# Patient Record
Sex: MALE | Race: WHITE | NOT HISPANIC OR LATINO | Employment: OTHER | URBAN - METROPOLITAN AREA
[De-identification: names, ages, dates, MRNs, and addresses within clinical notes are randomized per-mention and may not be internally consistent; named-entity substitution may affect disease eponyms.]

---

## 2023-03-09 ENCOUNTER — NURSING HOME VISIT (OUTPATIENT)
Dept: POST ACUTE CARE | Facility: EXTERNAL LOCATION | Age: 88
End: 2023-03-09
Payer: MEDICARE

## 2023-03-09 DIAGNOSIS — I25.2 H/O NON-ST ELEVATION MYOCARDIAL INFARCTION (NSTEMI): ICD-10-CM

## 2023-03-09 DIAGNOSIS — D64.9 ANEMIA, UNSPECIFIED TYPE: Primary | ICD-10-CM

## 2023-03-09 DIAGNOSIS — K21.9 GASTROESOPHAGEAL REFLUX DISEASE WITHOUT ESOPHAGITIS: ICD-10-CM

## 2023-03-09 PROCEDURE — 99308 SBSQ NF CARE LOW MDM 20: CPT | Performed by: NURSE PRACTITIONER

## 2023-03-09 NOTE — LETTER
Patient ID: Chidi Carrillo is a 88 y.o. male who is acute skilled care being seen and evaluated for multiple medical problems.  [id number]   4/13/1934  Svitlana Carmona MURALI  DOS: 3/9/2023     /67   Pulse 71   Temp 36.6 °C (97.9 °F)   Resp 17   Wt 89.8 kg (198 lb)   SpO2 95%     Assessment/Plan  Problem List Items Addressed This Visit          Digestive    Gastroesophageal reflux disease without esophagitis     Protonix 40 mg by mouth daily, Sucralfate 1 gram by mouth AC/HS. Denies GI upset            Hematologic    Anemia - Primary     Hgb 7.7. Client denies hematuria or blood in stool. Client instructed to follow up with PCP or go to ED if bleeding noted             Other    H/O non-ST elevation myocardial infarction (NSTEMI)     Denies CP. Brilinta 90 mg by mouth bID, ASA 81 mg by mouth daily, Carvedilol 3.125 mg by mouth bID            HPI: Client states he will discharging home tomorrow. Home health with aide and nurse, physical therapy, and occupational therapy. He needs a wheelchair. This client is not able to use a walker safely at this time second to an unsteady gait. He needs a wheelchair to assist with ADLs including dressing and hygiene. He has upper body strength and can self propel. Discussed hgb 7.7. Client states he will follow up OP. Denies bleeding. Client denies HA, dizziness, or lightheadedness. Denies CP, SOB, Cough, or increased edema. Denies abdominal pain, N/V, diarrhea, or constipation. Denies dysuria. No current C/O pain.         Review of Systems ROS as described in in HPI     Physical Exam  Constitutional:       Appearance: Normal appearance.      Comments: Sitting in wheelchair    HENT:      Head: Normocephalic.      Mouth/Throat:      Mouth: Mucous membranes are moist.   Cardiovascular:      Rate and Rhythm: Normal rate.   Pulmonary:      Effort: Pulmonary effort is normal.      Breath sounds: Wheezing present.   Abdominal:      General: Bowel sounds are normal.      Palpations:  Abdomen is soft.   Musculoskeletal:      Cervical back: Neck supple.      Comments: Wheelchair transfer, unsteady gait    Skin:     General: Skin is warm and dry.   Neurological:      Mental Status: He is alert. Mental status is at baseline.   Psychiatric:         Mood and Affect: Mood normal.      Comments: Adamant about discharging tomorrow

## 2023-03-12 VITALS
RESPIRATION RATE: 17 BRPM | DIASTOLIC BLOOD PRESSURE: 67 MMHG | WEIGHT: 198 LBS | OXYGEN SATURATION: 95 % | SYSTOLIC BLOOD PRESSURE: 111 MMHG | HEART RATE: 71 BPM | TEMPERATURE: 97.9 F

## 2023-03-12 PROBLEM — K21.9 GASTROESOPHAGEAL REFLUX DISEASE WITHOUT ESOPHAGITIS: Status: ACTIVE | Noted: 2023-03-12

## 2023-03-12 PROBLEM — I25.2 H/O NON-ST ELEVATION MYOCARDIAL INFARCTION (NSTEMI): Status: ACTIVE | Noted: 2023-03-12

## 2023-03-12 PROBLEM — D64.9 ANEMIA: Status: ACTIVE | Noted: 2023-03-12

## 2023-03-12 NOTE — ASSESSMENT & PLAN NOTE
Hgb 7.7. Client denies hematuria or blood in stool. Client instructed to follow up with PCP or go to ED if bleeding noted

## 2023-03-12 NOTE — PROGRESS NOTES
Patient ID: Chidi Carrillo is a 88 y.o. male who is acute skilled care being seen and evaluated for multiple medical problems.  [id number]   4/13/1934  Svitlana Carmona MURALI  DOS: 3/9/2023     /67   Pulse 71   Temp 36.6 °C (97.9 °F)   Resp 17   Wt 89.8 kg (198 lb)   SpO2 95%     Assessment/Plan  Problem List Items Addressed This Visit          Digestive    Gastroesophageal reflux disease without esophagitis     Protonix 40 mg by mouth daily, Sucralfate 1 gram by mouth AC/HS. Denies GI upset            Hematologic    Anemia - Primary     Hgb 7.7. Client denies hematuria or blood in stool. Client instructed to follow up with PCP or go to ED if bleeding noted             Other    H/O non-ST elevation myocardial infarction (NSTEMI)     Denies CP. Brilinta 90 mg by mouth bID, ASA 81 mg by mouth daily, Carvedilol 3.125 mg by mouth bID            HPI: Client states he will discharging home tomorrow. Home health with aide and nurse, physical therapy, and occupational therapy. He needs a wheelchair. This client is not able to use a walker safely at this time second to an unsteady gait. He needs a wheelchair to assist with ADLs including dressing and hygiene. He has upper body strength and can self propel. Discussed hgb 7.7. Client states he will follow up OP. Denies bleeding. Client denies HA, dizziness, or lightheadedness. Denies CP, SOB, Cough, or increased edema. Denies abdominal pain, N/V, diarrhea, or constipation. Denies dysuria. No current C/O pain.         Review of Systems ROS as described in in HPI     Physical Exam  Constitutional:       Appearance: Normal appearance.      Comments: Sitting in wheelchair    HENT:      Head: Normocephalic.      Mouth/Throat:      Mouth: Mucous membranes are moist.   Cardiovascular:      Rate and Rhythm: Normal rate.   Pulmonary:      Effort: Pulmonary effort is normal.      Breath sounds: Wheezing present.   Abdominal:      General: Bowel sounds are normal.      Palpations:  Abdomen is soft.   Musculoskeletal:      Cervical back: Neck supple.      Comments: Wheelchair transfer, unsteady gait    Skin:     General: Skin is warm and dry.   Neurological:      Mental Status: He is alert. Mental status is at baseline.   Psychiatric:         Mood and Affect: Mood normal.      Comments: Adamant about discharging tomorrow

## 2024-01-01 NOTE — ASSESSMENT & PLAN NOTE
Denies CP. Brilinta 90 mg by mouth bID, ASA 81 mg by mouth daily, Carvedilol 3.125 mg by mouth bID    no